# Patient Record
Sex: FEMALE | Race: WHITE | NOT HISPANIC OR LATINO | Employment: FULL TIME | URBAN - METROPOLITAN AREA
[De-identification: names, ages, dates, MRNs, and addresses within clinical notes are randomized per-mention and may not be internally consistent; named-entity substitution may affect disease eponyms.]

---

## 2023-03-17 ENCOUNTER — OFFICE VISIT (OUTPATIENT)
Dept: PRIMARY CARE | Facility: CLINIC | Age: 28
End: 2023-03-17
Payer: COMMERCIAL

## 2023-03-17 VITALS — TEMPERATURE: 98.6 F | HEART RATE: 88 BPM | RESPIRATION RATE: 16 BRPM | OXYGEN SATURATION: 98 % | WEIGHT: 224 LBS

## 2023-03-17 DIAGNOSIS — I10 PRIMARY HYPERTENSION: ICD-10-CM

## 2023-03-17 DIAGNOSIS — J30.89 ALLERGIC RHINITIS DUE TO OTHER ALLERGIC TRIGGER, UNSPECIFIED SEASONALITY: ICD-10-CM

## 2023-03-17 DIAGNOSIS — F90.0 ADHD (ATTENTION DEFICIT HYPERACTIVITY DISORDER), INATTENTIVE TYPE: ICD-10-CM

## 2023-03-17 DIAGNOSIS — E04.2 MULTIPLE THYROID NODULES: ICD-10-CM

## 2023-03-17 DIAGNOSIS — J45.40 MODERATE PERSISTENT ASTHMA, UNSPECIFIED WHETHER COMPLICATED (HHS-HCC): Primary | ICD-10-CM

## 2023-03-17 DIAGNOSIS — Z13.220 LIPID SCREENING: ICD-10-CM

## 2023-03-17 DIAGNOSIS — F41.8 DEPRESSION WITH ANXIETY: ICD-10-CM

## 2023-03-17 DIAGNOSIS — K04.7 TOOTH INFECTION: ICD-10-CM

## 2023-03-17 DIAGNOSIS — F43.10 PTSD (POST-TRAUMATIC STRESS DISORDER): ICD-10-CM

## 2023-03-17 DIAGNOSIS — E55.9 VITAMIN D DEFICIENCY: ICD-10-CM

## 2023-03-17 PROBLEM — E66.9 OBESITY (BMI 30-39.9): Status: ACTIVE | Noted: 2018-08-29

## 2023-03-17 PROBLEM — J30.9 ALLERGIC RHINITIS: Status: ACTIVE | Noted: 2019-07-22

## 2023-03-17 PROBLEM — J45.21 MILD INTERMITTENT ASTHMA WITH ACUTE EXACERBATION (HHS-HCC): Status: ACTIVE | Noted: 2018-09-26

## 2023-03-17 PROBLEM — J45.909 ASTHMA (HHS-HCC): Status: ACTIVE | Noted: 2023-03-17

## 2023-03-17 PROBLEM — T78.40XA ALLERGIES: Status: ACTIVE | Noted: 2023-03-17

## 2023-03-17 PROCEDURE — 80324 DRUG SCREEN AMPHETAMINES 1/2: CPT

## 2023-03-17 PROCEDURE — 99204 OFFICE O/P NEW MOD 45 MIN: CPT | Performed by: NURSE PRACTITIONER

## 2023-03-17 PROCEDURE — 1036F TOBACCO NON-USER: CPT | Performed by: NURSE PRACTITIONER

## 2023-03-17 PROCEDURE — 80307 DRUG TEST PRSMV CHEM ANLYZR: CPT

## 2023-03-17 RX ORDER — ALBUTEROL SULFATE 0.83 MG/ML
2.5 SOLUTION RESPIRATORY (INHALATION) EVERY 6 HOURS PRN
COMMUNITY
End: 2023-03-17 | Stop reason: SDUPTHER

## 2023-03-17 RX ORDER — DEXTROAMPHETAMINE SACCHARATE, AMPHETAMINE ASPARTATE MONOHYDRATE, DEXTROAMPHETAMINE SULFATE AND AMPHETAMINE SULFATE 2.5; 2.5; 2.5; 2.5 MG/1; MG/1; MG/1; MG/1
20 CAPSULE, EXTENDED RELEASE ORAL DAILY
Qty: 30 CAPSULE | Refills: 0 | Status: SHIPPED | OUTPATIENT
Start: 2023-03-17 | End: 2023-03-21 | Stop reason: SDUPTHER

## 2023-03-17 RX ORDER — LEVOCETIRIZINE DIHYDROCHLORIDE 5 MG/1
5 TABLET, FILM COATED ORAL DAILY
Qty: 30 TABLET | Refills: 11 | Status: SHIPPED | OUTPATIENT
Start: 2023-03-17 | End: 2023-08-14 | Stop reason: SDUPTHER

## 2023-03-17 RX ORDER — ALBUTEROL SULFATE 90 UG/1
2 AEROSOL, METERED RESPIRATORY (INHALATION) EVERY 4 HOURS PRN
COMMUNITY
End: 2023-03-17 | Stop reason: SDUPTHER

## 2023-03-17 RX ORDER — FLUTICASONE PROPIONATE 44 UG/1
2 AEROSOL, METERED RESPIRATORY (INHALATION)
COMMUNITY

## 2023-03-17 RX ORDER — DEXTROAMPHETAMINE SACCHARATE, AMPHETAMINE ASPARTATE MONOHYDRATE, DEXTROAMPHETAMINE SULFATE AND AMPHETAMINE SULFATE 2.5; 2.5; 2.5; 2.5 MG/1; MG/1; MG/1; MG/1
10 CAPSULE, EXTENDED RELEASE ORAL DAILY
COMMUNITY
Start: 2023-02-13 | End: 2023-03-17 | Stop reason: SDUPTHER

## 2023-03-17 RX ORDER — ALBUTEROL SULFATE 90 UG/1
2 AEROSOL, METERED RESPIRATORY (INHALATION) EVERY 4 HOURS PRN
Qty: 18 G | Refills: 11 | Status: SHIPPED | OUTPATIENT
Start: 2023-03-17 | End: 2024-04-09

## 2023-03-17 RX ORDER — FLUOXETINE 10 MG/1
30 CAPSULE ORAL DAILY
Qty: 90 CAPSULE | Refills: 1 | Status: SHIPPED | OUTPATIENT
Start: 2023-03-17 | End: 2023-04-28 | Stop reason: SDUPTHER

## 2023-03-17 RX ORDER — AMOXICILLIN 875 MG/1
875 TABLET, FILM COATED ORAL 2 TIMES DAILY
Qty: 20 TABLET | Refills: 0 | Status: SHIPPED | OUTPATIENT
Start: 2023-03-17 | End: 2023-03-27

## 2023-03-17 RX ORDER — CLOBETASOL PROPIONATE 0.5 MG/G
CREAM TOPICAL 2 TIMES DAILY
COMMUNITY

## 2023-03-17 RX ORDER — NIFEDIPINE 30 MG/1
30 TABLET, EXTENDED RELEASE ORAL
COMMUNITY
Start: 2021-06-21 | End: 2023-04-14 | Stop reason: SDUPTHER

## 2023-03-17 RX ORDER — LEVOCETIRIZINE DIHYDROCHLORIDE 5 MG/1
5 TABLET, FILM COATED ORAL DAILY
COMMUNITY
End: 2023-03-17 | Stop reason: SDUPTHER

## 2023-03-17 RX ORDER — ALBUTEROL SULFATE 0.83 MG/ML
2.5 SOLUTION RESPIRATORY (INHALATION) EVERY 6 HOURS PRN
Qty: 75 ML | Refills: 3 | Status: SHIPPED | OUTPATIENT
Start: 2023-03-17 | End: 2023-07-26 | Stop reason: SDUPTHER

## 2023-03-17 RX ORDER — FLUTICASONE PROPIONATE 50 MCG
1 SPRAY, SUSPENSION (ML) NASAL DAILY
COMMUNITY

## 2023-03-17 RX ORDER — FLUOXETINE HYDROCHLORIDE 20 MG/1
20 CAPSULE ORAL
Qty: 30 CAPSULE | Refills: 11 | COMMUNITY
Start: 2023-02-16 | End: 2023-03-17 | Stop reason: SDUPTHER

## 2023-03-17 ASSESSMENT — ENCOUNTER SYMPTOMS
DEPRESSION: 1
SLEEP QUALITY: NON-RESTORATIVE
HYPERTENSION: 1
HOURS OF SLEEP PER NIGHT: 5 HOURS
NIGHTTIME AWAKENINGS: MANY

## 2023-03-17 NOTE — PROGRESS NOTES
Subjective   Patient ID: Angeles Abrams is a 27 y.o. female who presents for medication check.    Patient has been on zoloft wellbutrin lexapro and paxil in the past.    Patient lives with her friend Nickie Matos, and has a 7 year old daughter. She watches her friends 2 kids (age 2 and 2 months) and [employer] in the afternoon.    She has been taking the Prozac 20mg for awhile. Aleshia Waller (online psychiatrist) who diagnosed her with ADHD and she has been taking the 10mg of adderall for almost 1 month.     Pt here to establish care and discuss depression, asthma, HTN    ADHD  This is a chronic problem. The current episode started more than 1 year ago. The problem occurs constantly. The problem has been gradually improving. Pertinent negatives include no chest pain, chills, congestion, coughing, fatigue, fever, headaches, joint swelling, myalgias, nausea, sore throat or vomiting. Nothing aggravates the symptoms. The treatment provided moderate relief.   Hypertension  This is a chronic problem. The current episode started more than 1 year ago. The problem has been gradually improving since onset. The problem is controlled. Pertinent negatives include no chest pain, headaches, palpitations or shortness of breath. Agents associated with hypertension include amphetamines. There are no known risk factors for coronary artery disease. The current treatment provides mild improvement.   Depression  Visit Type: follow-up  Patient is not experiencing: decreased concentration, nervousness/anxiety, palpitations and shortness of breath.  Frequency of symptoms: constantly   Severity: moderate   Sleep per night: 5 hours  Sleep quality: non-restorative  Nighttime awakenings: many  Patient has a history of: asthma  Compliance with medications:  %    Asthma  There is no cough, shortness of breath or wheezing. This is a chronic problem. The current episode started more than 1 year ago. The problem occurs constantly. The problem  has been unchanged. Pertinent negatives include no chest pain, ear pain, fever, headaches, myalgias, rhinorrhea or sore throat. Her symptoms are aggravated by animal exposure, change in weather and pollen. Her symptoms are alleviated by steroid inhaler. She reports moderate improvement on treatment. Her past medical history is significant for asthma.        Review of Systems   Constitutional:  Negative for chills, fatigue and fever.   HENT:  Negative for congestion, ear pain, rhinorrhea, sinus pressure and sore throat.    Eyes:  Negative for pain, discharge and itching.   Respiratory:  Negative for cough, shortness of breath and wheezing.    Cardiovascular:  Negative for chest pain and palpitations.   Gastrointestinal:  Negative for constipation, diarrhea, nausea and vomiting.   Genitourinary:  Negative for difficulty urinating and dysuria.   Musculoskeletal:  Negative for back pain, joint swelling and myalgias.   Skin:  Negative for color change.   Neurological:  Negative for headaches.   Hematological:  Negative for adenopathy.   Psychiatric/Behavioral:  Positive for depression. Negative for decreased concentration. The patient is not nervous/anxious.        Objective   Pulse 88   Temp 37 °C (98.6 °F)   Resp 16   Wt 102 kg (224 lb)   SpO2 98%     Physical Exam  Constitutional:       General: She is not in acute distress.     Appearance: She is obese. She is not ill-appearing.   HENT:      Head: Normocephalic and atraumatic.      Mouth/Throat:      Mouth: Mucous membranes are moist.      Pharynx: Oropharynx is clear.   Eyes:      Conjunctiva/sclera: Conjunctivae normal.      Pupils: Pupils are equal, round, and reactive to light.   Neck:      Thyroid: Thyromegaly present.   Cardiovascular:      Rate and Rhythm: Normal rate and regular rhythm.      Pulses: Normal pulses.      Heart sounds: Normal heart sounds.   Pulmonary:      Effort: Pulmonary effort is normal. No respiratory distress.      Breath sounds:  Normal breath sounds.   Abdominal:      General: Bowel sounds are normal.      Palpations: Abdomen is soft.      Tenderness: There is no abdominal tenderness.   Musculoskeletal:         General: Normal range of motion.   Skin:     General: Skin is warm and dry.   Neurological:      General: No focal deficit present.      Mental Status: She is alert and oriented to person, place, and time.   Psychiatric:         Mood and Affect: Mood is anxious and depressed. Affect is flat.         Behavior: Behavior normal.         Thought Content: Thought content normal.         Judgment: Judgment normal.         Assessment/Plan   Problem List Items Addressed This Visit          Respiratory    Asthma - Primary    Relevant Medications    albuterol 2.5 mg /3 mL (0.083 %) nebulizer solution    albuterol 90 mcg/actuation inhaler    levocetirizine (Xyzal) 5 mg tablet       Circulatory    Primary hypertension    Relevant Orders    CBC and Auto Differential    Comprehensive Metabolic Panel       Other    ADHD (attention deficit hyperactivity disorder), inattentive type    Allergic rhinitis    Relevant Medications    levocetirizine (Xyzal) 5 mg tablet    Depression with anxiety    Relevant Orders    TSH with reflex to Free T4 if abnormal    PTSD (post-traumatic stress disorder)     Other Visit Diagnoses       Vitamin D deficiency        Relevant Orders    Vitamin D, Total    Lipid screening        Relevant Orders    Lipid Panel

## 2023-03-18 LAB
AMPHETAMINE (PRESENCE) IN URINE BY SCREEN METHOD: NORMAL
BARBITURATES PRESENCE IN URINE BY SCREEN METHOD: NORMAL
BENZODIAZEPINE (PRESENCE) IN URINE BY SCREEN METHOD: NORMAL
CANNABINOIDS IN URINE BY SCREEN METHOD: NORMAL
COCAINE (PRESENCE) IN URINE BY SCREEN METHOD: NORMAL
DRUG SCREEN COMMENT URINE: NORMAL
FENTANYL URINE: NORMAL
METHADONE (PRESENCE) IN URINE BY SCREEN METHOD: NORMAL
OPIATES (PRESENCE) IN URINE BY SCREEN METHOD: NORMAL
OXYCODONE (PRESENCE) IN URINE BY SCREEN METHOD: NORMAL
PHENCYCLIDINE (PRESENCE) IN URINE BY SCREEN METHOD: NORMAL

## 2023-03-20 PROCEDURE — 80307 DRUG TEST PRSMV CHEM ANLYZR: CPT

## 2023-03-20 PROCEDURE — 80324 DRUG SCREEN AMPHETAMINES 1/2: CPT

## 2023-03-21 DIAGNOSIS — F90.0 ADHD (ATTENTION DEFICIT HYPERACTIVITY DISORDER), INATTENTIVE TYPE: ICD-10-CM

## 2023-03-21 RX ORDER — DEXTROAMPHETAMINE SACCHARATE, AMPHETAMINE ASPARTATE MONOHYDRATE, DEXTROAMPHETAMINE SULFATE AND AMPHETAMINE SULFATE 2.5; 2.5; 2.5; 2.5 MG/1; MG/1; MG/1; MG/1
20 CAPSULE, EXTENDED RELEASE ORAL DAILY
Qty: 30 CAPSULE | Refills: 0 | Status: SHIPPED | OUTPATIENT
Start: 2023-03-21 | End: 2023-03-24 | Stop reason: SDUPTHER

## 2023-03-23 LAB
AMPHETAMINES,URINE: <50 NG/ML
MDA,URINE: <200 NG/ML
MDEA,URINE: <200 NG/ML
MDMA,UR: <200 NG/ML
METHAMPHETAMINE QUANTITATIVE URINE: <200 NG/ML
PHENTERMINE,UR: <200 NG/ML

## 2023-03-24 DIAGNOSIS — F90.0 ADHD (ATTENTION DEFICIT HYPERACTIVITY DISORDER), INATTENTIVE TYPE: ICD-10-CM

## 2023-03-24 RX ORDER — DEXTROAMPHETAMINE SACCHARATE, AMPHETAMINE ASPARTATE MONOHYDRATE, DEXTROAMPHETAMINE SULFATE AND AMPHETAMINE SULFATE 2.5; 2.5; 2.5; 2.5 MG/1; MG/1; MG/1; MG/1
20 CAPSULE, EXTENDED RELEASE ORAL DAILY
Qty: 60 CAPSULE | Refills: 0 | Status: SHIPPED | OUTPATIENT
Start: 2023-03-24 | End: 2023-03-27 | Stop reason: SDUPTHER

## 2023-03-27 RX ORDER — DEXTROAMPHETAMINE SACCHARATE, AMPHETAMINE ASPARTATE MONOHYDRATE, DEXTROAMPHETAMINE SULFATE AND AMPHETAMINE SULFATE 2.5; 2.5; 2.5; 2.5 MG/1; MG/1; MG/1; MG/1
20 CAPSULE, EXTENDED RELEASE ORAL DAILY
Qty: 60 CAPSULE | Refills: 0 | Status: SHIPPED | OUTPATIENT
Start: 2023-03-27 | End: 2023-04-28 | Stop reason: ALTCHOICE

## 2023-03-27 ASSESSMENT — ENCOUNTER SYMPTOMS
MYALGIAS: 0
VOMITING: 0
COLOR CHANGE: 0
DYSURIA: 0
JOINT SWELLING: 0
BACK PAIN: 0
DIARRHEA: 0
NERVOUS/ANXIOUS: 0
HEADACHES: 0
PALPITATIONS: 0
ADENOPATHY: 0
EYE ITCHING: 0
DIFFICULTY URINATING: 0
CHILLS: 0
EYE PAIN: 0
NAUSEA: 0
SORE THROAT: 0
FEVER: 0
SINUS PRESSURE: 0
FATIGUE: 0
EYE DISCHARGE: 0
DECREASED CONCENTRATION: 0
WHEEZING: 0
SHORTNESS OF BREATH: 0
COUGH: 0
CONSTIPATION: 0
RHINORRHEA: 0

## 2023-03-27 NOTE — PATIENT INSTRUCTIONS
Patient to continue medications as ordered. Encouraged to increase adderall dose, and follow-up in 1 month for med check and physical. Also encouraged to have labs drawn, and we will call with results when available. Call the office if any problems or concerns in the meantime.     More than 50% of the visit was spent counseling the patient. A total of more than 30 minutes was spent.

## 2023-04-10 ENCOUNTER — APPOINTMENT (OUTPATIENT)
Dept: PRIMARY CARE | Facility: CLINIC | Age: 28
End: 2023-04-10
Payer: COMMERCIAL

## 2023-04-14 DIAGNOSIS — I10 PRIMARY HYPERTENSION: Primary | ICD-10-CM

## 2023-04-14 RX ORDER — NIFEDIPINE 30 MG/1
30 TABLET, FILM COATED, EXTENDED RELEASE ORAL
Qty: 30 TABLET | Refills: 1 | Status: SHIPPED | OUTPATIENT
Start: 2023-04-14 | End: 2023-04-17 | Stop reason: SINTOL

## 2023-04-17 ENCOUNTER — TELEPHONE (OUTPATIENT)
Dept: PRIMARY CARE | Facility: CLINIC | Age: 28
End: 2023-04-17
Payer: COMMERCIAL

## 2023-04-17 DIAGNOSIS — I10 PRIMARY HYPERTENSION: Primary | ICD-10-CM

## 2023-04-17 RX ORDER — NIFEDIPINE 30 MG/1
30 TABLET, EXTENDED RELEASE ORAL DAILY
Qty: 30 TABLET | Refills: 2 | Status: SHIPPED | OUTPATIENT
Start: 2023-04-17 | End: 2023-09-18

## 2023-04-17 NOTE — TELEPHONE ENCOUNTER
The pharmacy called stating that the Nifedipine that was sent in is the the one the patient is allergic to and we need to send a different script in because they are unable to change it themselves.

## 2023-04-24 ENCOUNTER — APPOINTMENT (OUTPATIENT)
Dept: PRIMARY CARE | Facility: CLINIC | Age: 28
End: 2023-04-24
Payer: COMMERCIAL

## 2023-04-28 ENCOUNTER — OFFICE VISIT (OUTPATIENT)
Dept: PRIMARY CARE | Facility: CLINIC | Age: 28
End: 2023-04-28
Payer: COMMERCIAL

## 2023-04-28 VITALS
RESPIRATION RATE: 16 BRPM | WEIGHT: 222.6 LBS | SYSTOLIC BLOOD PRESSURE: 120 MMHG | HEART RATE: 86 BPM | TEMPERATURE: 97.7 F | DIASTOLIC BLOOD PRESSURE: 74 MMHG

## 2023-04-28 DIAGNOSIS — F41.8 DEPRESSION WITH ANXIETY: ICD-10-CM

## 2023-04-28 DIAGNOSIS — F43.10 PTSD (POST-TRAUMATIC STRESS DISORDER): ICD-10-CM

## 2023-04-28 DIAGNOSIS — F90.0 ADHD (ATTENTION DEFICIT HYPERACTIVITY DISORDER), INATTENTIVE TYPE: ICD-10-CM

## 2023-04-28 PROCEDURE — 1036F TOBACCO NON-USER: CPT | Performed by: NURSE PRACTITIONER

## 2023-04-28 PROCEDURE — 3074F SYST BP LT 130 MM HG: CPT | Performed by: NURSE PRACTITIONER

## 2023-04-28 PROCEDURE — 99213 OFFICE O/P EST LOW 20 MIN: CPT | Performed by: NURSE PRACTITIONER

## 2023-04-28 PROCEDURE — 3078F DIAST BP <80 MM HG: CPT | Performed by: NURSE PRACTITIONER

## 2023-04-28 RX ORDER — CHOLECALCIFEROL (VITAMIN D3) 50 MCG
2000 TABLET ORAL
COMMUNITY
Start: 2021-02-04

## 2023-04-28 RX ORDER — DEXTROAMPHETAMINE SACCHARATE, AMPHETAMINE ASPARTATE MONOHYDRATE, DEXTROAMPHETAMINE SULFATE AND AMPHETAMINE SULFATE 6.25; 6.25; 6.25; 6.25 MG/1; MG/1; MG/1; MG/1
25 CAPSULE, EXTENDED RELEASE ORAL DAILY
Qty: 30 CAPSULE | Refills: 0 | Status: SHIPPED | OUTPATIENT
Start: 2023-04-28 | End: 2023-06-02

## 2023-04-28 RX ORDER — FLUOXETINE 10 MG/1
30 CAPSULE ORAL DAILY
Qty: 90 CAPSULE | Refills: 1 | Status: SHIPPED | OUTPATIENT
Start: 2023-04-28 | End: 2023-06-02 | Stop reason: SDUPTHER

## 2023-04-28 ASSESSMENT — ENCOUNTER SYMPTOMS
JOINT SWELLING: 0
NERVOUS/ANXIOUS: 0
SORE THROAT: 0
MYALGIAS: 0
DECREASED CONCENTRATION: 0
COLOR CHANGE: 0
NAUSEA: 0
WHEEZING: 0
FATIGUE: 0
EYE DISCHARGE: 0
BACK PAIN: 0
EYE PAIN: 0
HEADACHES: 0
CHILLS: 0
DIARRHEA: 0
DYSURIA: 0
COUGH: 0
CONSTIPATION: 0
ADENOPATHY: 0
VOMITING: 0
FEVER: 0
DIFFICULTY URINATING: 0
SHORTNESS OF BREATH: 0
RHINORRHEA: 0
EYE ITCHING: 0
PALPITATIONS: 0
SINUS PRESSURE: 0

## 2023-04-28 NOTE — PATIENT INSTRUCTIONS
Increased Adderall to 25mg, continue prozac at 30mg daily. Follow-up in 1 month or sooner if needed. Call the office if any problems or concerns in the meantime.

## 2023-04-28 NOTE — PROGRESS NOTES
"Subjective   Patient ID: Angeles Abrams is a 27 y.o. female who presents for ADHD.    Pt here for ADHD follow up    Patient has been taking 20mg of Adderall XR, would like to increase to 25mg every day. \"Before I started on adderall my mind was running at 1000, now it's down to 700, so there's still room for improvement.\"    ADHD  This is a chronic problem. The current episode started more than 1 year ago. The problem occurs constantly. The problem has been gradually improving. Pertinent negatives include no chest pain, chills, congestion, coughing, fatigue, fever, headaches, joint swelling, myalgias, nausea, sore throat or vomiting. Nothing aggravates the symptoms. She has tried nothing for the symptoms. The treatment provided mild relief.     Review of Systems   Constitutional:  Negative for chills, fatigue and fever.   HENT:  Negative for congestion, ear pain, rhinorrhea, sinus pressure and sore throat.    Eyes:  Negative for pain, discharge and itching.   Respiratory:  Negative for cough, shortness of breath and wheezing.    Cardiovascular:  Negative for chest pain and palpitations.   Gastrointestinal:  Negative for constipation, diarrhea, nausea and vomiting.   Genitourinary:  Negative for difficulty urinating and dysuria.   Musculoskeletal:  Negative for back pain, joint swelling and myalgias.   Skin:  Negative for color change.   Neurological:  Negative for headaches.   Hematological:  Negative for adenopathy.   Psychiatric/Behavioral:  Negative for decreased concentration. The patient is not nervous/anxious.        Objective   /74   Pulse 86   Temp 36.5 °C (97.7 °F)   Resp 16   Wt 101 kg (222 lb 9.6 oz)     Physical Exam    Physical Exam  Constitutional:       General: She is not in acute distress.     Appearance: She is obese. She is not ill-appearing.   Cardiovascular:      Rate and Rhythm: Normal rate and regular rhythm.      Pulses: Normal pulses.      Heart sounds: Normal heart sounds. "   Pulmonary:      Effort: Pulmonary effort is normal. No respiratory distress.      Breath sounds: Normal breath sounds.   Abdominal:      General: Bowel sounds are normal.      Palpations: Abdomen is soft.      Tenderness: There is no abdominal tenderness.   Musculoskeletal:         General: Normal range of motion.   Skin:     General: Skin is warm and dry.   Neurological:      General: No focal deficit present.      Mental Status: She is alert and oriented to person, place, and time.   Psychiatric:         Mood and Affect: Mood is normal. Affect is flat.         Behavior: Behavior normal.         Thought Content: Thought content normal.         Judgment: Judgment normal.      Assessment/Plan   Problem List Items Addressed This Visit          Other    ADHD (attention deficit hyperactivity disorder), inattentive type    Relevant Medications    amphetamine-dextroamphetamine XR (Adderall XR) 25 mg 24 hr capsule    Depression with anxiety    Relevant Medications    FLUoxetine (PROzac) 10 mg capsule    amphetamine-dextroamphetamine XR (Adderall XR) 25 mg 24 hr capsule    PTSD (post-traumatic stress disorder)    Relevant Medications    FLUoxetine (PROzac) 10 mg capsule     Patient Instructions   Increased Adderall to 25mg, continue prozac at 30mg daily. Follow-up in 1 month or sooner if needed. Call the office if any problems or concerns in the meantime.

## 2023-05-02 ENCOUNTER — APPOINTMENT (OUTPATIENT)
Dept: PRIMARY CARE | Facility: CLINIC | Age: 28
End: 2023-05-02
Payer: COMMERCIAL

## 2023-05-26 ENCOUNTER — APPOINTMENT (OUTPATIENT)
Dept: PRIMARY CARE | Facility: CLINIC | Age: 28
End: 2023-05-26
Payer: COMMERCIAL

## 2023-06-02 ENCOUNTER — APPOINTMENT (OUTPATIENT)
Dept: PRIMARY CARE | Facility: CLINIC | Age: 28
End: 2023-06-02
Payer: COMMERCIAL

## 2023-06-02 ENCOUNTER — TELEMEDICINE (OUTPATIENT)
Dept: PRIMARY CARE | Facility: CLINIC | Age: 28
End: 2023-06-02
Payer: COMMERCIAL

## 2023-06-02 DIAGNOSIS — F90.0 ADHD (ATTENTION DEFICIT HYPERACTIVITY DISORDER), INATTENTIVE TYPE: Primary | ICD-10-CM

## 2023-06-02 DIAGNOSIS — F43.10 PTSD (POST-TRAUMATIC STRESS DISORDER): ICD-10-CM

## 2023-06-02 DIAGNOSIS — F41.8 DEPRESSION WITH ANXIETY: ICD-10-CM

## 2023-06-02 PROCEDURE — 99213 OFFICE O/P EST LOW 20 MIN: CPT | Performed by: NURSE PRACTITIONER

## 2023-06-02 RX ORDER — LISDEXAMFETAMINE DIMESYLATE 40 MG/1
40 CAPSULE ORAL EVERY MORNING
Qty: 30 CAPSULE | Refills: 0 | Status: SHIPPED | OUTPATIENT
Start: 2023-06-02 | End: 2023-07-13 | Stop reason: SDUPTHER

## 2023-06-02 RX ORDER — FLUOXETINE 10 MG/1
30 CAPSULE ORAL DAILY
Qty: 90 CAPSULE | Refills: 1
Start: 2023-06-02 | End: 2023-07-02

## 2023-06-02 ASSESSMENT — ENCOUNTER SYMPTOMS
HEADACHES: 1
FATIGUE: 1

## 2023-06-02 NOTE — PROGRESS NOTES
Subjective   Patient ID: Angeles Abrams is a 27 y.o. female who presents for Follow-up ( Adderall dosage ).    Patient states that she wants to discuss changing medication and trying something different then the adderall. She feels like the adderall is no longer really helping, and she is also is only hungry once a day and its not the normal for her. Patient feels tired constantly, and it wasn't working as well at the higher dose.     OARRS:  No data recorded  I have personally reviewed the OARRS report for Angeles Abrams. I have considered the risks of abuse, dependence, addiction and diversion    Is the patient prescribed a combination of a benzodiazepine and opioid?  No    Last Urine Drug Screen / ordered today: No  Recent Results (from the past 06307 hour(s))  -Drug Screen, Urine With Reflex to Confirmation:   Collection Time: 03/20/23  5:51 PM       Result                      Value             Ref Range           DRUG SCREEN COMMENT UR*     SEE BELOW                             Amphetamine Screen, Ur*                       NEGATIVE        PRESUMPTIVE NEGATIVE       Barbiturate Screen, Ur*                       NEGATIVE        PRESUMPTIVE NEGATIVE       BENZODIAZEPINE (PRESEN*                       NEGATIVE        PRESUMPTIVE NEGATIVE       Cannabinoid Screen, Ur*                       NEGATIVE        PRESUMPTIVE NEGATIVE       Cocaine Screen, Urine                         NEGATIVE        PRESUMPTIVE NEGATIVE       Fentanyl, Ur                                  NEGATIVE        PRESUMPTIVE NEGATIVE       Methadone Screen, Urine                       NEGATIVE        PRESUMPTIVE NEGATIVE       Opiate Screen, Urine                          NEGATIVE        PRESUMPTIVE NEGATIVE       Oxycodone Screen, Ur                          NEGATIVE        PRESUMPTIVE NEGATIVE       PCP Screen, Urine                             NEGATIVE        PRESUMPTIVE NEGATIVE  -Amphetamine Confirm, Urine:   Collection Time: 03/20/23  5:50 PM        Result                      Value             Ref Range           Methamphetamine Quant,*     <200              ng/mL               MDA, Urine                  <200              ng/mL               MDEA, Urine                 <200              ng/mL               Phentermine,Urine           <200              ng/mL               Amphetamines,Urine          <50               ng/mL               MDMA, Urine                 <200              ng/mL          Results are as expected.     Controlled Substance Agreement:  Date of the Last Agreement: 3.17.2023  Reviewed Controlled Substance Agreement including but not limited to the benefits, risks, and alternatives to treatment with a Controlled Substance medication(s).    Stimulants:   What is the patient's goal of therapy? Concentrate and not feeling fatigued all the time   Is this being achieved with current treatment? no    Activities of Daily Living:   Is your overall impression that this patient is benefiting (symptom reduction outweighs side effects) from stimulant therapy? No     1. Physical Functioning: Same  2. Family Relationship: Same  3. Social Relationship: Worse  4. Mood: Worse  5. Sleep Patterns: Worse  6. Overall Function: Same      ADHD  This is a recurrent problem. The current episode started 1 to 4 weeks ago. The problem occurs constantly. The problem has been gradually worsening. Associated symptoms include fatigue and headaches. Pertinent negatives include no chest pain, chills, congestion, coughing, fever, joint swelling, myalgias, nausea, sore throat or vomiting.        Review of Systems   Constitutional:  Positive for fatigue. Negative for chills and fever.   HENT:  Negative for congestion, ear pain, rhinorrhea, sinus pressure and sore throat.    Eyes:  Negative for pain, discharge and itching.   Respiratory:  Negative for cough, shortness of breath and wheezing.    Cardiovascular:  Negative for chest pain and palpitations.   Gastrointestinal:   Negative for constipation, diarrhea, nausea and vomiting.   Genitourinary:  Negative for difficulty urinating and dysuria.   Musculoskeletal:  Negative for back pain, joint swelling and myalgias.   Skin:  Negative for color change.   Neurological:  Positive for headaches.   Hematological:  Negative for adenopathy.   Psychiatric/Behavioral:  Negative for decreased concentration. The patient is not nervous/anxious.        Objective   There were no vitals taken for this visit.    Physical Exam    Assessment/Plan   Problem List Items Addressed This Visit       ADHD (attention deficit hyperactivity disorder), inattentive type - Primary    Depression with anxiety    PTSD (post-traumatic stress disorder)          Patient Instructions   Patient to start vyvanse and prozac as ordered. Follow-up in 6-8 weeks, or sooner if needed. Call the office if any problems or concerns in the meantime.     This visit was completed via telehealth due to the restrictions of the COVID-19 pandemic. All issues as below were discussed and addressed but no physical exam was performed. If it was felt that the patient should be evaluated in clinic then they were directed there. The patient verbally consented to visit.  Spent 10 minutes with pt face to face and more than 50% of this time was spent in counseling and coordination of care.

## 2023-07-08 ASSESSMENT — ENCOUNTER SYMPTOMS
DIARRHEA: 0
JOINT SWELLING: 0
SORE THROAT: 0
RHINORRHEA: 0
NAUSEA: 0
CONSTIPATION: 0
MYALGIAS: 0
SINUS PRESSURE: 0
DIFFICULTY URINATING: 0
EYE ITCHING: 0
CHILLS: 0
COLOR CHANGE: 0
VOMITING: 0
EYE PAIN: 0
BACK PAIN: 0
DECREASED CONCENTRATION: 0
SHORTNESS OF BREATH: 0
EYE DISCHARGE: 0
DYSURIA: 0
COUGH: 0
FEVER: 0
WHEEZING: 0
PALPITATIONS: 0
NERVOUS/ANXIOUS: 0
ADENOPATHY: 0

## 2023-07-08 NOTE — PATIENT INSTRUCTIONS
Patient to start vyvanse and prozac as ordered. Follow-up in 6-8 weeks, or sooner if needed. Call the office if any problems or concerns in the meantime.     This visit was completed via telehealth due to the restrictions of the COVID-19 pandemic. All issues as below were discussed and addressed but no physical exam was performed. If it was felt that the patient should be evaluated in clinic then they were directed there. The patient verbally consented to visit.  Spent 10 minutes with pt face to face and more than 50% of this time was spent in counseling and coordination of care.

## 2023-07-13 DIAGNOSIS — F90.0 ADHD (ATTENTION DEFICIT HYPERACTIVITY DISORDER), INATTENTIVE TYPE: ICD-10-CM

## 2023-07-14 RX ORDER — LISDEXAMFETAMINE DIMESYLATE 40 MG/1
40 CAPSULE ORAL EVERY MORNING
Qty: 30 CAPSULE | Refills: 0 | Status: SHIPPED | OUTPATIENT
Start: 2023-07-14 | End: 2023-08-13

## 2023-07-26 DIAGNOSIS — J45.40 MODERATE PERSISTENT ASTHMA, UNSPECIFIED WHETHER COMPLICATED (HHS-HCC): ICD-10-CM

## 2023-07-26 RX ORDER — ALBUTEROL SULFATE 0.83 MG/ML
2.5 SOLUTION RESPIRATORY (INHALATION) EVERY 6 HOURS PRN
Qty: 375 ML | Refills: 0 | Status: SHIPPED | OUTPATIENT
Start: 2023-07-26 | End: 2023-08-17

## 2023-08-14 DIAGNOSIS — J45.40 MODERATE PERSISTENT ASTHMA, UNSPECIFIED WHETHER COMPLICATED (HHS-HCC): ICD-10-CM

## 2023-08-14 DIAGNOSIS — J30.89 ALLERGIC RHINITIS DUE TO OTHER ALLERGIC TRIGGER, UNSPECIFIED SEASONALITY: ICD-10-CM

## 2023-08-14 RX ORDER — LEVOCETIRIZINE DIHYDROCHLORIDE 5 MG/1
5 TABLET, FILM COATED ORAL DAILY
Qty: 90 TABLET | Refills: 1 | Status: SHIPPED | OUTPATIENT
Start: 2023-08-14 | End: 2024-03-18

## 2023-08-17 DIAGNOSIS — J45.40 MODERATE PERSISTENT ASTHMA, UNSPECIFIED WHETHER COMPLICATED (HHS-HCC): ICD-10-CM

## 2023-08-17 RX ORDER — ALBUTEROL SULFATE 0.83 MG/ML
SOLUTION RESPIRATORY (INHALATION)
Qty: 375 ML | Refills: 0 | Status: SHIPPED | OUTPATIENT
Start: 2023-08-17 | End: 2023-09-18

## 2023-09-17 DIAGNOSIS — I10 PRIMARY HYPERTENSION: ICD-10-CM

## 2023-09-17 DIAGNOSIS — J45.40 MODERATE PERSISTENT ASTHMA, UNSPECIFIED WHETHER COMPLICATED (HHS-HCC): ICD-10-CM

## 2023-09-18 DIAGNOSIS — J45.40 MODERATE PERSISTENT ASTHMA, UNSPECIFIED WHETHER COMPLICATED (HHS-HCC): ICD-10-CM

## 2023-09-18 RX ORDER — ALBUTEROL SULFATE 0.83 MG/ML
SOLUTION RESPIRATORY (INHALATION)
Qty: 375 ML | Refills: 0 | Status: SHIPPED | OUTPATIENT
Start: 2023-09-18 | End: 2023-10-26

## 2023-09-18 RX ORDER — NIFEDIPINE 30 MG/1
30 TABLET, EXTENDED RELEASE ORAL DAILY
Qty: 90 TABLET | Refills: 0 | Status: SHIPPED | OUTPATIENT
Start: 2023-09-18 | End: 2023-12-14

## 2023-09-19 RX ORDER — ALBUTEROL SULFATE 0.83 MG/ML
SOLUTION RESPIRATORY (INHALATION)
Qty: 375 ML | Refills: 0 | OUTPATIENT
Start: 2023-09-19

## 2023-10-24 ENCOUNTER — TELEMEDICINE (OUTPATIENT)
Dept: PRIMARY CARE | Facility: CLINIC | Age: 28
End: 2023-10-24
Payer: COMMERCIAL

## 2023-10-24 DIAGNOSIS — J45.42 MODERATE PERSISTENT ASTHMA WITH STATUS ASTHMATICUS (HHS-HCC): ICD-10-CM

## 2023-10-24 DIAGNOSIS — F90.0 ADHD (ATTENTION DEFICIT HYPERACTIVITY DISORDER), INATTENTIVE TYPE: Primary | ICD-10-CM

## 2023-10-24 PROCEDURE — 99213 OFFICE O/P EST LOW 20 MIN: CPT | Performed by: NURSE PRACTITIONER

## 2023-10-24 RX ORDER — METHYLPHENIDATE HYDROCHLORIDE 10 MG/1
10 TABLET ORAL 2 TIMES DAILY
Qty: 60 TABLET | Refills: 0 | Status: SHIPPED | OUTPATIENT
Start: 2023-10-24 | End: 2024-01-13 | Stop reason: SINTOL

## 2023-10-25 DIAGNOSIS — J45.40 MODERATE PERSISTENT ASTHMA, UNSPECIFIED WHETHER COMPLICATED (HHS-HCC): ICD-10-CM

## 2023-10-26 RX ORDER — ALBUTEROL SULFATE 0.83 MG/ML
SOLUTION RESPIRATORY (INHALATION)
Qty: 375 ML | Refills: 0 | Status: SHIPPED | OUTPATIENT
Start: 2023-10-26 | End: 2023-12-06 | Stop reason: SDUPTHER

## 2023-10-31 ENCOUNTER — APPOINTMENT (OUTPATIENT)
Dept: PRIMARY CARE | Facility: CLINIC | Age: 28
End: 2023-10-31
Payer: COMMERCIAL

## 2023-11-14 DIAGNOSIS — F90.0 ADHD (ATTENTION DEFICIT HYPERACTIVITY DISORDER), INATTENTIVE TYPE: Primary | ICD-10-CM

## 2023-11-14 PROBLEM — J45.42 MODERATE PERSISTENT ASTHMA WITH STATUS ASTHMATICUS (HHS-HCC): Status: ACTIVE | Noted: 2023-11-14

## 2023-11-14 RX ORDER — VILOXAZINE HYDROCHLORIDE 200 MG/1
200 CAPSULE, EXTENDED RELEASE ORAL DAILY
Qty: 30 CAPSULE | Refills: 0 | Status: SHIPPED | OUTPATIENT
Start: 2023-11-14 | End: 2023-12-14

## 2023-11-14 ASSESSMENT — ENCOUNTER SYMPTOMS
EYE PAIN: 0
RHINORRHEA: 0
DYSURIA: 0
SINUS PRESSURE: 0
COLOR CHANGE: 0
FATIGUE: 1
FEVER: 0
EYE DISCHARGE: 0
EYE ITCHING: 0
MYALGIAS: 0
CONSTIPATION: 0
DIFFICULTY URINATING: 0
NAUSEA: 0
WHEEZING: 0
NERVOUS/ANXIOUS: 0
SHORTNESS OF BREATH: 0
ADENOPATHY: 0
VOMITING: 0
BACK PAIN: 0
DECREASED CONCENTRATION: 1
ABDOMINAL PAIN: 1
CHILLS: 0
COUGH: 0
DIARRHEA: 0
PALPITATIONS: 0
SORE THROAT: 0
HEADACHES: 0
JOINT SWELLING: 0

## 2023-11-14 NOTE — PATIENT INSTRUCTIONS
Patient to start taking ritalin as ordered. Follow-up in 2-3 months, or sooner if needed. Call the office if any problems or concerns in the meantime.     This visit was completed via telehealth due to the restrictions of the COVID-19 pandemic. All issues as below were discussed and addressed but no physical exam was performed. If it was felt that the patient should be evaluated in clinic then they were directed there. The patient verbally consented to visit.  Spent 10 minutes with pt face to face and more than 50% of this time was spent in counseling and coordination of care.

## 2023-11-14 NOTE — PROGRESS NOTES
Subjective   Patient ID: Angeles Abrams is a 28 y.o. female who presents for virtual appt for ADHD.    ADHD  This is a chronic problem. The current episode started more than 1 year ago. The problem occurs constantly. The problem has been gradually improving. Associated symptoms include abdominal pain and fatigue. Pertinent negatives include no chest pain, chills, congestion, coughing, fever, headaches, joint swelling, myalgias, nausea, sore throat or vomiting. Nothing aggravates the symptoms. She has tried eating, lying down, rest, drinking and position changes for the symptoms. The treatment provided no relief.     Patient has tried many different doses of Vyvanse, and Adderall before that. She has had many side effects with each med. She is interested in trying Ritalin.    Review of Systems   Constitutional:  Positive for fatigue. Negative for chills and fever.   HENT:  Negative for congestion, ear pain, rhinorrhea, sinus pressure and sore throat.    Eyes:  Negative for pain, discharge and itching.   Respiratory:  Negative for cough, shortness of breath and wheezing.    Cardiovascular:  Negative for chest pain and palpitations.   Gastrointestinal:  Positive for abdominal pain. Negative for constipation, diarrhea, nausea and vomiting.   Genitourinary:  Negative for difficulty urinating and dysuria.   Musculoskeletal:  Negative for back pain, joint swelling and myalgias.   Skin:  Negative for color change.   Neurological:  Negative for headaches.   Hematological:  Negative for adenopathy.   Psychiatric/Behavioral:  Positive for decreased concentration. The patient is not nervous/anxious.      Objective     There were no vitals taken for this visit.     Physical Exam    Assessment/Plan   Problem List Items Addressed This Visit       ADHD (attention deficit hyperactivity disorder), inattentive type - Primary    Relevant Medications    methylphenidate (Ritalin) 10 mg tablet       Patient Instructions   Patient to start  taking ritalin as ordered. Follow-up in 2-3 months, or sooner if needed. Call the office if any problems or concerns in the meantime.     This visit was completed via telehealth due to the restrictions of the COVID-19 pandemic. All issues as below were discussed and addressed but no physical exam was performed. If it was felt that the patient should be evaluated in clinic then they were directed there. The patient verbally consented to visit.  Spent 10 minutes with pt face to face and more than 50% of this time was spent in counseling and coordination of care.

## 2023-12-06 DIAGNOSIS — J45.40 MODERATE PERSISTENT ASTHMA, UNSPECIFIED WHETHER COMPLICATED (HHS-HCC): ICD-10-CM

## 2023-12-07 RX ORDER — ALBUTEROL SULFATE 0.83 MG/ML
SOLUTION RESPIRATORY (INHALATION)
Qty: 375 ML | Refills: 2 | Status: SHIPPED | OUTPATIENT
Start: 2023-12-07 | End: 2024-03-07

## 2023-12-14 DIAGNOSIS — I10 PRIMARY HYPERTENSION: ICD-10-CM

## 2023-12-14 RX ORDER — NIFEDIPINE 30 MG/1
30 TABLET, EXTENDED RELEASE ORAL DAILY
Qty: 90 TABLET | Refills: 0 | Status: SHIPPED | OUTPATIENT
Start: 2023-12-14

## 2024-01-13 DIAGNOSIS — F90.0 ADHD (ATTENTION DEFICIT HYPERACTIVITY DISORDER), INATTENTIVE TYPE: Primary | ICD-10-CM

## 2024-01-13 RX ORDER — LISDEXAMFETAMINE DIMESYLATE 40 MG/1
40 CAPSULE ORAL EVERY MORNING
Qty: 30 CAPSULE | Refills: 0 | Status: SHIPPED | OUTPATIENT
Start: 2024-01-13 | End: 2024-02-12

## 2024-03-07 DIAGNOSIS — J45.40 MODERATE PERSISTENT ASTHMA, UNSPECIFIED WHETHER COMPLICATED (HHS-HCC): ICD-10-CM

## 2024-03-07 RX ORDER — ALBUTEROL SULFATE 0.83 MG/ML
SOLUTION RESPIRATORY (INHALATION)
Qty: 375 ML | Refills: 0 | Status: SHIPPED | OUTPATIENT
Start: 2024-03-07 | End: 2024-04-09

## 2024-03-17 DIAGNOSIS — J30.89 ALLERGIC RHINITIS DUE TO OTHER ALLERGIC TRIGGER, UNSPECIFIED SEASONALITY: ICD-10-CM

## 2024-03-17 DIAGNOSIS — J45.40 MODERATE PERSISTENT ASTHMA, UNSPECIFIED WHETHER COMPLICATED (HHS-HCC): ICD-10-CM

## 2024-03-18 RX ORDER — LEVOCETIRIZINE DIHYDROCHLORIDE 5 MG/1
5 TABLET, FILM COATED ORAL DAILY
Qty: 30 TABLET | Refills: 3 | Status: SHIPPED | OUTPATIENT
Start: 2024-03-18

## 2024-04-08 DIAGNOSIS — J45.40 MODERATE PERSISTENT ASTHMA, UNSPECIFIED WHETHER COMPLICATED (HHS-HCC): ICD-10-CM

## 2024-04-09 RX ORDER — ALBUTEROL SULFATE 0.83 MG/ML
SOLUTION RESPIRATORY (INHALATION)
Qty: 375 ML | Refills: 0 | Status: SHIPPED | OUTPATIENT
Start: 2024-04-09 | End: 2024-05-07

## 2024-04-09 RX ORDER — ALBUTEROL SULFATE 90 UG/1
AEROSOL, METERED RESPIRATORY (INHALATION)
Qty: 8.5 G | Refills: 0 | Status: SHIPPED | OUTPATIENT
Start: 2024-04-09 | End: 2024-05-08

## 2024-05-06 DIAGNOSIS — J45.40 MODERATE PERSISTENT ASTHMA, UNSPECIFIED WHETHER COMPLICATED (HHS-HCC): ICD-10-CM

## 2024-05-07 DIAGNOSIS — J45.40 MODERATE PERSISTENT ASTHMA, UNSPECIFIED WHETHER COMPLICATED (HHS-HCC): ICD-10-CM

## 2024-05-07 RX ORDER — ALBUTEROL SULFATE 0.83 MG/ML
SOLUTION RESPIRATORY (INHALATION)
Qty: 375 ML | Refills: 0 | OUTPATIENT
Start: 2024-05-07

## 2024-05-07 RX ORDER — ALBUTEROL SULFATE 0.83 MG/ML
SOLUTION RESPIRATORY (INHALATION)
Qty: 375 ML | Refills: 0 | Status: SHIPPED | OUTPATIENT
Start: 2024-05-07 | End: 2024-05-08

## 2024-05-08 DIAGNOSIS — J45.40 MODERATE PERSISTENT ASTHMA, UNSPECIFIED WHETHER COMPLICATED (HHS-HCC): ICD-10-CM

## 2024-05-08 RX ORDER — ALBUTEROL SULFATE 0.83 MG/ML
SOLUTION RESPIRATORY (INHALATION)
Qty: 375 ML | Refills: 0 | Status: SHIPPED | OUTPATIENT
Start: 2024-05-08 | End: 2024-06-06

## 2024-05-08 RX ORDER — ALBUTEROL SULFATE 90 UG/1
AEROSOL, METERED RESPIRATORY (INHALATION)
Qty: 8.5 G | Refills: 0 | Status: SHIPPED | OUTPATIENT
Start: 2024-05-08

## 2024-05-08 RX ORDER — ALBUTEROL SULFATE 90 UG/1
AEROSOL, METERED RESPIRATORY (INHALATION)
Qty: 8.5 G | Refills: 0 | OUTPATIENT
Start: 2024-05-08

## 2024-05-10 DIAGNOSIS — J45.40 MODERATE PERSISTENT ASTHMA, UNSPECIFIED WHETHER COMPLICATED (HHS-HCC): ICD-10-CM

## 2024-05-10 RX ORDER — ALBUTEROL SULFATE 90 UG/1
AEROSOL, METERED RESPIRATORY (INHALATION)
Qty: 8.5 G | Refills: 0 | OUTPATIENT
Start: 2024-05-10

## 2024-06-05 DIAGNOSIS — J45.40 MODERATE PERSISTENT ASTHMA, UNSPECIFIED WHETHER COMPLICATED (HHS-HCC): ICD-10-CM

## 2024-06-06 RX ORDER — ALBUTEROL SULFATE 0.83 MG/ML
SOLUTION RESPIRATORY (INHALATION)
Qty: 375 ML | Refills: 0 | OUTPATIENT
Start: 2024-06-06

## 2024-06-06 RX ORDER — ALBUTEROL SULFATE 0.83 MG/ML
SOLUTION RESPIRATORY (INHALATION)
Qty: 375 ML | Refills: 0 | Status: SHIPPED | OUTPATIENT
Start: 2024-06-06

## 2024-07-01 DIAGNOSIS — I10 PRIMARY HYPERTENSION: ICD-10-CM

## 2024-07-01 DIAGNOSIS — J45.40 MODERATE PERSISTENT ASTHMA, UNSPECIFIED WHETHER COMPLICATED (HHS-HCC): ICD-10-CM

## 2024-07-01 RX ORDER — ALBUTEROL SULFATE 90 UG/1
AEROSOL, METERED RESPIRATORY (INHALATION)
Qty: 8.5 G | Refills: 0 | Status: SHIPPED | OUTPATIENT
Start: 2024-07-01

## 2024-07-01 RX ORDER — NIFEDIPINE 30 MG/1
30 TABLET, EXTENDED RELEASE ORAL DAILY
Qty: 90 TABLET | Refills: 0 | Status: SHIPPED | OUTPATIENT
Start: 2024-07-01

## 2024-07-14 DIAGNOSIS — J45.40 MODERATE PERSISTENT ASTHMA, UNSPECIFIED WHETHER COMPLICATED (HHS-HCC): ICD-10-CM

## 2024-07-15 DIAGNOSIS — J45.40 MODERATE PERSISTENT ASTHMA, UNSPECIFIED WHETHER COMPLICATED (HHS-HCC): ICD-10-CM

## 2024-07-15 RX ORDER — ALBUTEROL SULFATE 0.83 MG/ML
SOLUTION RESPIRATORY (INHALATION)
Qty: 375 ML | Refills: 0 | Status: SHIPPED | OUTPATIENT
Start: 2024-07-15

## 2024-07-15 RX ORDER — ALBUTEROL SULFATE 0.83 MG/ML
SOLUTION RESPIRATORY (INHALATION)
Qty: 375 ML | Refills: 0 | OUTPATIENT
Start: 2024-07-15

## 2024-08-18 DIAGNOSIS — J30.89 ALLERGIC RHINITIS DUE TO OTHER ALLERGIC TRIGGER, UNSPECIFIED SEASONALITY: ICD-10-CM

## 2024-08-18 DIAGNOSIS — J45.40 MODERATE PERSISTENT ASTHMA, UNSPECIFIED WHETHER COMPLICATED (HHS-HCC): ICD-10-CM

## 2024-08-19 RX ORDER — ALBUTEROL SULFATE 0.83 MG/ML
SOLUTION RESPIRATORY (INHALATION)
Qty: 375 ML | Refills: 0 | Status: SHIPPED | OUTPATIENT
Start: 2024-08-19

## 2024-08-19 RX ORDER — ALBUTEROL SULFATE 90 UG/1
INHALANT RESPIRATORY (INHALATION)
Qty: 8.5 G | Refills: 3 | Status: SHIPPED | OUTPATIENT
Start: 2024-08-19

## 2024-08-19 RX ORDER — LEVOCETIRIZINE DIHYDROCHLORIDE 5 MG/1
5 TABLET, FILM COATED ORAL DAILY
Qty: 90 TABLET | Refills: 1 | Status: SHIPPED | OUTPATIENT
Start: 2024-08-19

## 2024-09-17 DIAGNOSIS — J45.40 MODERATE PERSISTENT ASTHMA, UNSPECIFIED WHETHER COMPLICATED (HHS-HCC): ICD-10-CM

## 2024-09-17 DIAGNOSIS — J30.89 ALLERGIC RHINITIS DUE TO OTHER ALLERGIC TRIGGER, UNSPECIFIED SEASONALITY: ICD-10-CM

## 2024-09-17 DIAGNOSIS — I10 PRIMARY HYPERTENSION: ICD-10-CM

## 2024-09-19 RX ORDER — NIFEDIPINE 30 MG/1
30 TABLET, FILM COATED, EXTENDED RELEASE ORAL DAILY
Qty: 90 TABLET | Refills: 3 | Status: SHIPPED | OUTPATIENT
Start: 2024-09-19

## 2024-09-19 RX ORDER — FLUTICASONE PROPIONATE 50 MCG
1 SPRAY, SUSPENSION (ML) NASAL DAILY
Qty: 16 G | Refills: 3 | Status: SHIPPED | OUTPATIENT
Start: 2024-09-19

## 2024-09-19 RX ORDER — ALBUTEROL SULFATE 90 UG/1
2 INHALANT RESPIRATORY (INHALATION) EVERY 4 HOURS PRN
Qty: 8.5 G | Refills: 3 | Status: SHIPPED | OUTPATIENT
Start: 2024-09-19

## 2024-09-19 RX ORDER — ALBUTEROL SULFATE 0.83 MG/ML
SOLUTION RESPIRATORY (INHALATION)
Qty: 375 ML | Refills: 0 | OUTPATIENT
Start: 2024-09-19

## 2024-09-19 RX ORDER — NIFEDIPINE 30 MG/1
30 TABLET, EXTENDED RELEASE ORAL DAILY
Qty: 90 TABLET | Refills: 0 | OUTPATIENT
Start: 2024-09-19

## 2024-09-19 RX ORDER — LEVOCETIRIZINE DIHYDROCHLORIDE 5 MG/1
5 TABLET, FILM COATED ORAL DAILY
Qty: 90 TABLET | Refills: 3 | Status: SHIPPED | OUTPATIENT
Start: 2024-09-19

## 2024-09-19 RX ORDER — ALBUTEROL SULFATE 0.83 MG/ML
SOLUTION RESPIRATORY (INHALATION)
Qty: 375 ML | Refills: 3 | Status: SHIPPED | OUTPATIENT
Start: 2024-09-19

## 2024-12-03 ENCOUNTER — APPOINTMENT (OUTPATIENT)
Dept: PRIMARY CARE | Facility: CLINIC | Age: 29
End: 2024-12-03
Payer: COMMERCIAL

## 2025-01-05 DIAGNOSIS — J45.40 MODERATE PERSISTENT ASTHMA, UNSPECIFIED WHETHER COMPLICATED (HHS-HCC): ICD-10-CM

## 2025-01-06 RX ORDER — ALBUTEROL SULFATE 0.83 MG/ML
SOLUTION RESPIRATORY (INHALATION)
Qty: 375 ML | Refills: 0 | Status: SHIPPED | OUTPATIENT
Start: 2025-01-06

## 2025-02-13 ENCOUNTER — OFFICE VISIT (OUTPATIENT)
Dept: PRIMARY CARE | Facility: CLINIC | Age: 30
End: 2025-02-13
Payer: COMMERCIAL

## 2025-02-13 ENCOUNTER — APPOINTMENT (OUTPATIENT)
Dept: PRIMARY CARE | Facility: CLINIC | Age: 30
End: 2025-02-13
Payer: COMMERCIAL

## 2025-02-13 VITALS
WEIGHT: 249 LBS | SYSTOLIC BLOOD PRESSURE: 120 MMHG | HEIGHT: 63 IN | OXYGEN SATURATION: 97 % | DIASTOLIC BLOOD PRESSURE: 68 MMHG | RESPIRATION RATE: 16 BRPM | BODY MASS INDEX: 44.12 KG/M2 | TEMPERATURE: 97.7 F | HEART RATE: 80 BPM

## 2025-02-13 DIAGNOSIS — J45.42 MODERATE PERSISTENT ASTHMA WITH STATUS ASTHMATICUS (HHS-HCC): ICD-10-CM

## 2025-02-13 DIAGNOSIS — E55.9 VITAMIN D DEFICIENCY: ICD-10-CM

## 2025-02-13 DIAGNOSIS — I10 PRIMARY HYPERTENSION: ICD-10-CM

## 2025-02-13 DIAGNOSIS — E04.2 MULTIPLE THYROID NODULES: ICD-10-CM

## 2025-02-13 DIAGNOSIS — F90.0 ADHD (ATTENTION DEFICIT HYPERACTIVITY DISORDER), INATTENTIVE TYPE: Primary | ICD-10-CM

## 2025-02-13 DIAGNOSIS — Z13.220 LIPID SCREENING: ICD-10-CM

## 2025-02-13 DIAGNOSIS — F41.8 DEPRESSION WITH ANXIETY: ICD-10-CM

## 2025-02-13 DIAGNOSIS — Z83.3 FAMILY HISTORY OF DIABETES MELLITUS IN FATHER: ICD-10-CM

## 2025-02-13 DIAGNOSIS — F43.10 PTSD (POST-TRAUMATIC STRESS DISORDER): ICD-10-CM

## 2025-02-13 DIAGNOSIS — J45.40 MODERATE PERSISTENT ASTHMA, UNSPECIFIED WHETHER COMPLICATED (HHS-HCC): ICD-10-CM

## 2025-02-13 PROCEDURE — 3078F DIAST BP <80 MM HG: CPT | Performed by: NURSE PRACTITIONER

## 2025-02-13 PROCEDURE — 99214 OFFICE O/P EST MOD 30 MIN: CPT | Performed by: NURSE PRACTITIONER

## 2025-02-13 PROCEDURE — 3008F BODY MASS INDEX DOCD: CPT | Performed by: NURSE PRACTITIONER

## 2025-02-13 PROCEDURE — 1036F TOBACCO NON-USER: CPT | Performed by: NURSE PRACTITIONER

## 2025-02-13 PROCEDURE — 3074F SYST BP LT 130 MM HG: CPT | Performed by: NURSE PRACTITIONER

## 2025-02-13 RX ORDER — NIFEDIPINE 30 MG/1
30 TABLET, FILM COATED, EXTENDED RELEASE ORAL
Qty: 90 TABLET | Refills: 1 | Status: SHIPPED | OUTPATIENT
Start: 2025-02-13 | End: 2025-08-12

## 2025-02-13 RX ORDER — ALBUTEROL SULFATE 90 UG/1
2 INHALANT RESPIRATORY (INHALATION) EVERY 4 HOURS PRN
Qty: 8.5 G | Refills: 3 | Status: SHIPPED | OUTPATIENT
Start: 2025-02-13

## 2025-02-13 RX ORDER — BUPROPION HYDROCHLORIDE 150 MG/1
150 TABLET ORAL EVERY MORNING
Qty: 90 TABLET | Refills: 1 | Status: SHIPPED | OUTPATIENT
Start: 2025-02-13 | End: 2025-08-12

## 2025-02-13 RX ORDER — FLUOXETINE HYDROCHLORIDE 20 MG/1
CAPSULE ORAL
COMMUNITY
Start: 2025-02-11 | End: 2025-02-13 | Stop reason: ALTCHOICE

## 2025-02-13 RX ORDER — ALBUTEROL SULFATE 0.83 MG/ML
SOLUTION RESPIRATORY (INHALATION)
Qty: 375 ML | Refills: 2 | Status: SHIPPED | OUTPATIENT
Start: 2025-02-13

## 2025-02-13 NOTE — PROGRESS NOTES
Subjective   Angeles Abrams is a 29 y.o. female who presents for Weight Loss, ADHD, and Med Refill.  Patient presents for mental health meds and weight gain issues.  Patient stated is only on 20mg of Prozac.  Hasn't taken Vyvanse in over a year.    Patient was on zoloft which didn't help. Paxil made her grind her teeth and caused terrible fatigue. Lexapro caused other side effects. Wellbutrin she took for a week but had to stop for insurance issues. Prozac has been working for her but since she stopped it she has more energy.     ADALAT caused her heart to race, she felt like she was going to pass out and her legs swelled. Needs Procardia or the generic version.      Review of Systems   Constitutional:  Positive for fatigue. Negative for chills and fever.   HENT:  Negative for rhinorrhea and sore throat.    Respiratory:  Negative for shortness of breath.    Cardiovascular:  Negative for chest pain.   Gastrointestinal:  Negative for constipation and diarrhea.   Genitourinary:  Negative for dysuria.   Neurological:  Negative for headaches.   Psychiatric/Behavioral:  Negative for sleep disturbance. The patient is not nervous/anxious.    All other systems reviewed and are negative.      Objective   Physical Exam  Constitutional:       General: She is not in acute distress.     Appearance: She is obese. She is not ill-appearing.   HENT:      Head: Normocephalic and atraumatic.      Right Ear: Tympanic membrane, ear canal and external ear normal.      Left Ear: Tympanic membrane, ear canal and external ear normal.      Mouth/Throat:      Mouth: Mucous membranes are moist.      Pharynx: Oropharynx is clear.   Eyes:      Conjunctiva/sclera: Conjunctivae normal.      Pupils: Pupils are equal, round, and reactive to light.   Cardiovascular:      Rate and Rhythm: Normal rate and regular rhythm.      Pulses: Normal pulses.      Heart sounds: Normal heart sounds.   Pulmonary:      Effort: Pulmonary effort is normal. No  "respiratory distress.      Breath sounds: Normal breath sounds.   Abdominal:      General: Bowel sounds are normal.      Palpations: Abdomen is soft.      Tenderness: There is no abdominal tenderness.   Musculoskeletal:         General: Normal range of motion.   Skin:     General: Skin is warm and dry.   Neurological:      General: No focal deficit present.      Mental Status: She is alert and oriented to person, place, and time.   Psychiatric:         Mood and Affect: Mood normal. Mood is not anxious. Affect is flat.         Behavior: Behavior normal.         Thought Content: Thought content normal.         Judgment: Judgment normal.       /68 (BP Location: Left arm, Patient Position: Sitting, BP Cuff Size: Large adult)   Pulse 80   Temp 36.5 °C (97.7 °F) (Temporal)   Resp 16   Ht 1.6 m (5' 3\")   Wt 113 kg (249 lb)   SpO2 97%   BMI 44.11 kg/m²       Assessment/Plan   Problem List Items Addressed This Visit       ADHD (attention deficit hyperactivity disorder), inattentive type - Primary    Relevant Medications    buPROPion XL (Wellbutrin XL) 150 mg 24 hr tablet    Asthma    Relevant Medications    albuterol 90 mcg/actuation inhaler    albuterol 2.5 mg /3 mL (0.083 %) nebulizer solution    Depression with anxiety    Relevant Medications    buPROPion XL (Wellbutrin XL) 150 mg 24 hr tablet    PTSD (post-traumatic stress disorder)    Primary hypertension    Relevant Medications    NIFEdipine ER (Procardia XL) 30 mg 24 hr tablet    Other Relevant Orders    CBC and Auto Differential    Comprehensive Metabolic Panel    Moderate persistent asthma with status asthmaticus ([facility]HS-HCC)     2.13.15- stable with prn albuterol          Other Visit Diagnoses       Family history of diabetes mellitus in father        Relevant Orders    Hemoglobin A1C    Vitamin D deficiency        Relevant Orders    Vitamin D 25-Hydroxy,Total (for eval of Vitamin D levels)    Lipid screening        Relevant Orders    Lipid Panel    " Multiple thyroid nodules        Relevant Orders    TSH with reflex to Free T4 if abnormal          Patient Instructions   Patient to restart wellbutrin in addition to prozac. Continue all other meds as ordered. Have fasting labs drawn, and we will call with results when available. Follow-up in 6-8 weeks for physical, and call the office if any problems or concerns in the meantime.

## 2025-02-15 ASSESSMENT — ENCOUNTER SYMPTOMS
SLEEP DISTURBANCE: 0
CHILLS: 0
DIARRHEA: 0
SHORTNESS OF BREATH: 0
CONSTIPATION: 0
SORE THROAT: 0
FATIGUE: 1
NERVOUS/ANXIOUS: 0
DYSURIA: 0
RHINORRHEA: 0
HEADACHES: 0
FEVER: 0

## 2025-02-15 NOTE — PATIENT INSTRUCTIONS
Patient to restart wellbutrin in addition to prozac. Continue all other meds as ordered. Have fasting labs drawn, and we will call with results when available. Follow-up in 6-8 weeks for physical, and call the office if any problems or concerns in the meantime.

## 2025-04-22 DIAGNOSIS — J45.40 MODERATE PERSISTENT ASTHMA, UNSPECIFIED WHETHER COMPLICATED (HHS-HCC): ICD-10-CM

## 2025-04-28 RX ORDER — ALBUTEROL SULFATE 0.83 MG/ML
SOLUTION RESPIRATORY (INHALATION)
Qty: 375 ML | Refills: 0 | Status: SHIPPED | OUTPATIENT
Start: 2025-04-28

## 2025-05-20 ENCOUNTER — APPOINTMENT (OUTPATIENT)
Dept: PRIMARY CARE | Facility: CLINIC | Age: 30
End: 2025-05-20
Payer: COMMERCIAL

## 2025-05-21 DIAGNOSIS — J45.40 MODERATE PERSISTENT ASTHMA, UNSPECIFIED WHETHER COMPLICATED (HHS-HCC): ICD-10-CM

## 2025-05-22 ENCOUNTER — TELEPHONE (OUTPATIENT)
Dept: PRIMARY CARE | Facility: CLINIC | Age: 30
End: 2025-05-22
Payer: COMMERCIAL

## 2025-05-22 DIAGNOSIS — J45.40 MODERATE PERSISTENT ASTHMA, UNSPECIFIED WHETHER COMPLICATED (HHS-HCC): ICD-10-CM

## 2025-05-22 RX ORDER — ALBUTEROL SULFATE 0.83 MG/ML
SOLUTION RESPIRATORY (INHALATION)
Qty: 375 ML | Refills: 0 | OUTPATIENT
Start: 2025-05-22

## 2025-05-22 RX ORDER — ALBUTEROL SULFATE 90 UG/1
2 INHALANT RESPIRATORY (INHALATION) EVERY 4 HOURS PRN
Qty: 8.5 G | Refills: 3 | Status: SHIPPED | OUTPATIENT
Start: 2025-05-22

## 2025-05-22 NOTE — TELEPHONE ENCOUNTER
Patient states she was informed by her pharmacy that the following Rx was denied    albuterol 2.5 mg /3 mL (0.083 %) nebulizer solution       She uses this multiple times a day and there are no refills on the Rx that was sent in April.      Patient stated she uses the nebulizer solution and the actuation inhaler.

## 2025-05-23 DIAGNOSIS — J45.40 MODERATE PERSISTENT ASTHMA, UNSPECIFIED WHETHER COMPLICATED (HHS-HCC): ICD-10-CM

## 2025-05-23 RX ORDER — ALBUTEROL SULFATE 0.83 MG/ML
SOLUTION RESPIRATORY (INHALATION)
Qty: 375 ML | Refills: 0 | Status: SHIPPED | OUTPATIENT
Start: 2025-05-23

## 2025-06-18 DIAGNOSIS — J45.40 MODERATE PERSISTENT ASTHMA, UNSPECIFIED WHETHER COMPLICATED (HHS-HCC): ICD-10-CM

## 2025-06-19 RX ORDER — ALBUTEROL SULFATE 0.83 MG/ML
SOLUTION RESPIRATORY (INHALATION)
Qty: 375 ML | Refills: 0 | OUTPATIENT
Start: 2025-06-19

## 2025-06-26 ENCOUNTER — APPOINTMENT (OUTPATIENT)
Dept: PRIMARY CARE | Facility: CLINIC | Age: 30
End: 2025-06-26
Payer: COMMERCIAL

## 2025-06-26 VITALS
HEIGHT: 63 IN | BODY MASS INDEX: 45.15 KG/M2 | SYSTOLIC BLOOD PRESSURE: 132 MMHG | RESPIRATION RATE: 16 BRPM | WEIGHT: 254.8 LBS | DIASTOLIC BLOOD PRESSURE: 88 MMHG | TEMPERATURE: 97.9 F | HEART RATE: 97 BPM | OXYGEN SATURATION: 96 %

## 2025-06-26 DIAGNOSIS — J45.40 MODERATE PERSISTENT ASTHMA, UNSPECIFIED WHETHER COMPLICATED (HHS-HCC): Primary | ICD-10-CM

## 2025-06-26 DIAGNOSIS — I10 PRIMARY HYPERTENSION: ICD-10-CM

## 2025-06-26 DIAGNOSIS — F41.8 DEPRESSION WITH ANXIETY: ICD-10-CM

## 2025-06-26 DIAGNOSIS — J30.89 ALLERGIC RHINITIS DUE TO OTHER ALLERGIC TRIGGER, UNSPECIFIED SEASONALITY: ICD-10-CM

## 2025-06-26 PROCEDURE — 3079F DIAST BP 80-89 MM HG: CPT | Performed by: NURSE PRACTITIONER

## 2025-06-26 PROCEDURE — 1036F TOBACCO NON-USER: CPT | Performed by: NURSE PRACTITIONER

## 2025-06-26 PROCEDURE — 3008F BODY MASS INDEX DOCD: CPT | Performed by: NURSE PRACTITIONER

## 2025-06-26 PROCEDURE — 3075F SYST BP GE 130 - 139MM HG: CPT | Performed by: NURSE PRACTITIONER

## 2025-06-26 PROCEDURE — 99214 OFFICE O/P EST MOD 30 MIN: CPT | Performed by: NURSE PRACTITIONER

## 2025-06-26 RX ORDER — BUDESONIDE AND FORMOTEROL FUMARATE DIHYDRATE 160; 4.5 UG/1; UG/1
2 AEROSOL RESPIRATORY (INHALATION)
Qty: 10.2 G | Refills: 11 | Status: SHIPPED | OUTPATIENT
Start: 2025-06-26 | End: 2025-06-26 | Stop reason: SDUPTHER

## 2025-06-26 RX ORDER — ALBUTEROL SULFATE 0.83 MG/ML
SOLUTION RESPIRATORY (INHALATION)
Qty: 180 ML | Refills: 0 | Status: SHIPPED | OUTPATIENT
Start: 2025-06-26

## 2025-06-26 RX ORDER — BUDESONIDE AND FORMOTEROL FUMARATE DIHYDRATE 160; 4.5 UG/1; UG/1
2 AEROSOL RESPIRATORY (INHALATION)
Qty: 10.2 G | Refills: 11 | Status: SHIPPED | OUTPATIENT
Start: 2025-06-26 | End: 2026-06-26

## 2025-06-26 RX ORDER — FLUOXETINE 20 MG/1
20 CAPSULE ORAL DAILY
COMMUNITY
End: 2025-06-26 | Stop reason: SDUPTHER

## 2025-06-26 RX ORDER — LEVOCETIRIZINE DIHYDROCHLORIDE 5 MG/1
5 TABLET, FILM COATED ORAL DAILY
Qty: 90 TABLET | Refills: 3 | Status: SHIPPED | OUTPATIENT
Start: 2025-06-26

## 2025-06-26 RX ORDER — FLUOXETINE 20 MG/1
20 CAPSULE ORAL DAILY
Qty: 90 CAPSULE | Refills: 3 | Status: SHIPPED | OUTPATIENT
Start: 2025-06-26 | End: 2026-06-26

## 2025-06-26 RX ORDER — NIFEDIPINE 30 MG/1
30 TABLET, FILM COATED, EXTENDED RELEASE ORAL
Qty: 90 TABLET | Refills: 1 | Status: SHIPPED | OUTPATIENT
Start: 2025-06-26 | End: 2025-12-23

## 2025-06-26 ASSESSMENT — PATIENT HEALTH QUESTIONNAIRE - PHQ9
1. LITTLE INTEREST OR PLEASURE IN DOING THINGS: NOT AT ALL
SUM OF ALL RESPONSES TO PHQ9 QUESTIONS 1 AND 2: 0
2. FEELING DOWN, DEPRESSED OR HOPELESS: NOT AT ALL

## 2025-06-26 NOTE — PROGRESS NOTES
Subjective   Patient ID: Angeles Abrams is a 29 y.o. female who presents for Follow-up (Medication check).  Patient presents for her asthma follow up.  She has been using albuterol multiple times per day, and has lots of problems if she runs out.    Asthma  She complains of chest tightness, cough and wheezing. This is a recurrent problem. The current episode started more than 1 year ago. The problem occurs constantly. The problem has been unchanged. The cough is hoarse, non-productive, hacking and harsh. Associated symptoms include dyspnea on exertion and malaise/fatigue. Pertinent negatives include no fever. Her symptoms are aggravated by nothing. Her symptoms are alleviated by beta-agonist. She reports minimal improvement on treatment. Her symptoms are not alleviated by rest, cold air and change in position. There are no known risk factors for lung disease. Her past medical history is significant for asthma.       Review of Systems   Constitutional:  Positive for malaise/fatigue. Negative for fever.   Respiratory:  Positive for cough and wheezing.    Cardiovascular:  Positive for dyspnea on exertion.   All other systems reviewed and are negative.      Objective   Physical Exam  Constitutional:       General: She is not in acute distress.     Appearance: She is not ill-appearing.   HENT:      Head: Normocephalic and atraumatic.      Right Ear: Tympanic membrane, ear canal and external ear normal.      Left Ear: Tympanic membrane, ear canal and external ear normal.      Nose: Rhinorrhea present. Rhinorrhea is clear.      Mouth/Throat:      Mouth: Mucous membranes are moist.      Pharynx: Oropharynx is clear. Postnasal drip present.   Eyes:      Conjunctiva/sclera: Conjunctivae normal.      Pupils: Pupils are equal, round, and reactive to light.   Cardiovascular:      Rate and Rhythm: Normal rate and regular rhythm.      Pulses: Normal pulses.      Heart sounds: Normal heart sounds.   Pulmonary:      Effort: Pulmonary  effort is normal. No respiratory distress.      Breath sounds: Normal breath sounds. Decreased air movement and transmitted upper airway sounds present.   Abdominal:      General: Bowel sounds are normal.      Palpations: Abdomen is soft.      Tenderness: There is no abdominal tenderness.   Musculoskeletal:         General: Normal range of motion.   Skin:     General: Skin is warm and dry.   Neurological:      General: No focal deficit present.      Mental Status: She is alert and oriented to person, place, and time.   Psychiatric:         Mood and Affect: Mood normal.         Behavior: Behavior normal.         Thought Content: Thought content normal.         Judgment: Judgment normal.         Assessment/Plan   Problem List Items Addressed This Visit       Allergic rhinitis    Relevant Medications    levocetirizine (Xyzal) 5 mg tablet    Asthma - Primary    Relevant Medications    levocetirizine (Xyzal) 5 mg tablet    albuterol-budesonide (Airsupra) 90-80 mcg/actuation inhaler    albuterol 2.5 mg /3 mL (0.083 %) nebulizer solution    Symbicort 160-4.5 mcg/actuation inhaler    Depression with anxiety    Relevant Medications    FLUoxetine (PROzac) 20 mg capsule    Primary hypertension    Relevant Medications    NIFEdipine ER (Procardia XL) 30 mg 24 hr tablet     Patient Instructions   Patient to start using symbicort and xyzal daily, and airsupra as needed. Follow-up in 3-6 months, or sooner if needed. Call the office if any problems or concerns in the meantime.      CATIE Iyer-CNP 07/05/25 8:08 AM

## 2025-07-05 ASSESSMENT — ENCOUNTER SYMPTOMS
FEVER: 0
COUGH: 1
CHEST TIGHTNESS: 1
DYSPNEA ON EXERTION: 1
WHEEZING: 1

## 2025-07-05 NOTE — PATIENT INSTRUCTIONS
Patient to start using symbicort and xyzal daily, and airsupra as needed. Follow-up in 3-6 months, or sooner if needed. Call the office if any problems or concerns in the meantime.

## 2025-09-23 ENCOUNTER — APPOINTMENT (OUTPATIENT)
Dept: PRIMARY CARE | Facility: CLINIC | Age: 30
End: 2025-09-23
Payer: COMMERCIAL